# Patient Record
Sex: FEMALE | URBAN - METROPOLITAN AREA
[De-identification: names, ages, dates, MRNs, and addresses within clinical notes are randomized per-mention and may not be internally consistent; named-entity substitution may affect disease eponyms.]

---

## 2017-05-15 ENCOUNTER — APPOINTMENT (RX ONLY)
Dept: URBAN - METROPOLITAN AREA CLINIC 161 | Facility: CLINIC | Age: 67
Setting detail: DERMATOLOGY
End: 2017-05-15

## 2017-05-15 DIAGNOSIS — L81.4 OTHER MELANIN HYPERPIGMENTATION: ICD-10-CM

## 2017-05-15 DIAGNOSIS — L57.0 ACTINIC KERATOSIS: ICD-10-CM

## 2017-05-15 DIAGNOSIS — D22 MELANOCYTIC NEVI: ICD-10-CM

## 2017-05-15 DIAGNOSIS — L82.1 OTHER SEBORRHEIC KERATOSIS: ICD-10-CM

## 2017-05-15 PROBLEM — D22.62 MELANOCYTIC NEVI OF LEFT UPPER LIMB, INCLUDING SHOULDER: Status: ACTIVE | Noted: 2017-05-15

## 2017-05-15 PROBLEM — D22.61 MELANOCYTIC NEVI OF RIGHT UPPER LIMB, INCLUDING SHOULDER: Status: ACTIVE | Noted: 2017-05-15

## 2017-05-15 PROBLEM — D22.5 MELANOCYTIC NEVI OF TRUNK: Status: ACTIVE | Noted: 2017-05-15

## 2017-05-15 PROCEDURE — ? PRESCRIPTION

## 2017-05-15 PROCEDURE — 17000 DESTRUCT PREMALG LESION: CPT

## 2017-05-15 PROCEDURE — 99213 OFFICE O/P EST LOW 20 MIN: CPT | Mod: 25

## 2017-05-15 PROCEDURE — ? LIQUID NITROGEN

## 2017-05-15 PROCEDURE — 17003 DESTRUCT PREMALG LES 2-14: CPT

## 2017-05-15 PROCEDURE — ? COUNSELING

## 2017-05-15 RX ORDER — HYDROQUINONE 4 %
CREAM (GRAM) TOPICAL
Qty: 1 | Refills: 1 | Status: ERX | COMMUNITY
Start: 2017-05-15

## 2017-05-15 RX ADMIN — Medication APPLY: at 00:00

## 2017-05-15 ASSESSMENT — LOCATION DETAILED DESCRIPTION DERM
LOCATION DETAILED: LEFT MEDIAL SUPERIOR CHEST
LOCATION DETAILED: RIGHT PROXIMAL LATERAL POSTERIOR UPPER ARM
LOCATION DETAILED: NASAL DORSUM
LOCATION DETAILED: RIGHT MEDIAL BREAST 12-1:00 REGION
LOCATION DETAILED: LEFT INFERIOR CENTRAL MALAR CHEEK
LOCATION DETAILED: LEFT PROXIMAL POSTERIOR UPPER ARM
LOCATION DETAILED: RIGHT SUPERIOR FOREHEAD
LOCATION DETAILED: RIGHT CENTRAL ZYGOMA
LOCATION DETAILED: RIGHT INFERIOR UPPER BACK
LOCATION DETAILED: RIGHT MEDIAL UPPER BACK

## 2017-05-15 ASSESSMENT — LOCATION SIMPLE DESCRIPTION DERM
LOCATION SIMPLE: RIGHT FOREHEAD
LOCATION SIMPLE: LEFT POSTERIOR UPPER ARM
LOCATION SIMPLE: RIGHT BREAST
LOCATION SIMPLE: RIGHT ZYGOMA
LOCATION SIMPLE: RIGHT POSTERIOR UPPER ARM
LOCATION SIMPLE: RIGHT UPPER BACK
LOCATION SIMPLE: NOSE
LOCATION SIMPLE: CHEST
LOCATION SIMPLE: LEFT CHEEK

## 2017-05-15 ASSESSMENT — LOCATION ZONE DERM
LOCATION ZONE: TRUNK
LOCATION ZONE: FACE
LOCATION ZONE: NOSE
LOCATION ZONE: ARM

## 2023-05-17 NOTE — HPI: EVALUATION OF SKIN LESION(S)
How Severe Are Your Spot(S)?: mild
Have Your Spot(S) Been Treated In The Past?: has not been treated
Hpi Title: Evaluation of Skin Lesions
Bcc Infiltrative Histology Text: There were numerous aggregates of basaloid cells demonstrating an infiltrative pattern.

## 2023-10-23 ENCOUNTER — HOSPITAL ENCOUNTER (OUTPATIENT)
Facility: HOSPITAL | Age: 73
Discharge: HOME | End: 2023-10-24
Attending: STUDENT IN AN ORGANIZED HEALTH CARE EDUCATION/TRAINING PROGRAM | Admitting: NEUROLOGICAL SURGERY
Payer: MEDICARE

## 2023-10-23 DIAGNOSIS — S19.80XA: ICD-10-CM
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
## 2023-10-23 DIAGNOSIS — S06.5XAA SDH (SUBDURAL HEMATOMA): Primary | ICD-10-CM

## 2023-10-23 DIAGNOSIS — F10.920 ALCOHOLIC INTOXICATION WITHOUT COMPLICATION: ICD-10-CM

## 2023-10-23 DIAGNOSIS — I63.9 STROKE: ICD-10-CM

## 2023-10-23 DIAGNOSIS — R19.7 DIARRHEA, UNSPECIFIED TYPE: ICD-10-CM

## 2023-10-23 PROBLEM — R29.898 RIGHT HAND WEAKNESS: Status: ACTIVE | Noted: 2023-10-23

## 2023-10-23 LAB
ABO + RH BLD: NORMAL
ALBUMIN SERPL BCP-MCNC: 3.8 G/DL (ref 3.5–5.2)
ALP SERPL-CCNC: 100 U/L (ref 55–135)
ALT SERPL W/O P-5'-P-CCNC: 28 U/L (ref 10–44)
ANION GAP SERPL CALC-SCNC: 14 MMOL/L (ref 8–16)
AST SERPL-CCNC: 34 U/L (ref 10–40)
BASOPHILS # BLD AUTO: 0.09 K/UL (ref 0–0.2)
BASOPHILS NFR BLD: 1 % (ref 0–1.9)
BILIRUB SERPL-MCNC: 0.3 MG/DL (ref 0.1–1)
BLD GP AB SCN CELLS X3 SERPL QL: NORMAL
BUN SERPL-MCNC: 8 MG/DL (ref 8–23)
CALCIUM SERPL-MCNC: 8.8 MG/DL (ref 8.7–10.5)
CHLORIDE SERPL-SCNC: 108 MMOL/L (ref 95–110)
CHOLEST SERPL-MCNC: 177 MG/DL (ref 120–199)
CHOLEST/HDLC SERPL: 2.6 {RATIO} (ref 2–5)
CO2 SERPL-SCNC: 17 MMOL/L (ref 23–29)
CREAT SERPL-MCNC: 0.7 MG/DL (ref 0.5–1.4)
DIFFERENTIAL METHOD: ABNORMAL
EOSINOPHIL # BLD AUTO: 0.2 K/UL (ref 0–0.5)
EOSINOPHIL NFR BLD: 2.2 % (ref 0–8)
ERYTHROCYTE [DISTWIDTH] IN BLOOD BY AUTOMATED COUNT: 14.4 % (ref 11.5–14.5)
EST. GFR  (NO RACE VARIABLE): >60 ML/MIN/1.73 M^2
ETHANOL SERPL-MCNC: 124 MG/DL
GLUCOSE SERPL-MCNC: 88 MG/DL (ref 70–110)
HCT VFR BLD AUTO: 45.2 % (ref 37–48.5)
HCV AB SERPL QL IA: NORMAL
HDLC SERPL-MCNC: 69 MG/DL (ref 40–75)
HDLC SERPL: 39 % (ref 20–50)
HGB BLD-MCNC: 14.6 G/DL (ref 12–16)
HIV 1+2 AB+HIV1 P24 AG SERPL QL IA: NORMAL
IMM GRANULOCYTES # BLD AUTO: 0.04 K/UL (ref 0–0.04)
IMM GRANULOCYTES NFR BLD AUTO: 0.5 % (ref 0–0.5)
INR PPP: 1 (ref 0.8–1.2)
LDLC SERPL CALC-MCNC: 84.8 MG/DL (ref 63–159)
LYMPHOCYTES # BLD AUTO: 1.4 K/UL (ref 1–4.8)
LYMPHOCYTES NFR BLD: 16.2 % (ref 18–48)
MCH RBC QN AUTO: 29.9 PG (ref 27–31)
MCHC RBC AUTO-ENTMCNC: 32.3 G/DL (ref 32–36)
MCV RBC AUTO: 92 FL (ref 82–98)
MONOCYTES # BLD AUTO: 0.7 K/UL (ref 0.3–1)
MONOCYTES NFR BLD: 8.2 % (ref 4–15)
NEUTROPHILS # BLD AUTO: 6.3 K/UL (ref 1.8–7.7)
NEUTROPHILS NFR BLD: 71.9 % (ref 38–73)
NONHDLC SERPL-MCNC: 108 MG/DL
NRBC BLD-RTO: 0 /100 WBC
PLATELET # BLD AUTO: 233 K/UL (ref 150–450)
PMV BLD AUTO: 9.8 FL (ref 9.2–12.9)
POC PTINR: 1 (ref 0.9–1.2)
POC PTWBT: 11.7 SEC (ref 9.7–14.3)
POCT GLUCOSE: 139 MG/DL (ref 70–110)
POTASSIUM SERPL-SCNC: 3.4 MMOL/L (ref 3.5–5.1)
PROT SERPL-MCNC: 6.9 G/DL (ref 6–8.4)
PROTHROMBIN TIME: 10.4 SEC (ref 9–12.5)
RBC # BLD AUTO: 4.89 M/UL (ref 4–5.4)
SAMPLE: NORMAL
SODIUM SERPL-SCNC: 139 MMOL/L (ref 136–145)
SPECIMEN OUTDATE: NORMAL
TRIGL SERPL-MCNC: 116 MG/DL (ref 30–150)
TSH SERPL DL<=0.005 MIU/L-ACNC: 1.62 UIU/ML (ref 0.4–4)
WBC # BLD AUTO: 8.71 K/UL (ref 3.9–12.7)

## 2023-10-23 PROCEDURE — 93010 ELECTROCARDIOGRAM REPORT: CPT | Mod: ,,, | Performed by: INTERNAL MEDICINE

## 2023-10-23 PROCEDURE — 80053 COMPREHEN METABOLIC PANEL: CPT | Performed by: STUDENT IN AN ORGANIZED HEALTH CARE EDUCATION/TRAINING PROGRAM

## 2023-10-23 PROCEDURE — 25500020 PHARM REV CODE 255: Performed by: STUDENT IN AN ORGANIZED HEALTH CARE EDUCATION/TRAINING PROGRAM

## 2023-10-23 PROCEDURE — 99204 PR OFFICE/OUTPT VISIT, NEW, LEVL IV, 45-59 MIN: ICD-10-PCS | Mod: FS,,, | Performed by: PSYCHIATRY & NEUROLOGY

## 2023-10-23 PROCEDURE — 99204 OFFICE O/P NEW MOD 45 MIN: CPT | Mod: FS,,, | Performed by: PSYCHIATRY & NEUROLOGY

## 2023-10-23 PROCEDURE — 93010 EKG 12-LEAD: ICD-10-PCS | Mod: ,,, | Performed by: INTERNAL MEDICINE

## 2023-10-23 PROCEDURE — 99900035 HC TECH TIME PER 15 MIN (STAT)

## 2023-10-23 PROCEDURE — 85610 PROTHROMBIN TIME: CPT

## 2023-10-23 PROCEDURE — 86900 BLOOD TYPING SEROLOGIC ABO: CPT | Performed by: STUDENT IN AN ORGANIZED HEALTH CARE EDUCATION/TRAINING PROGRAM

## 2023-10-23 PROCEDURE — 85025 COMPLETE CBC W/AUTO DIFF WBC: CPT | Performed by: STUDENT IN AN ORGANIZED HEALTH CARE EDUCATION/TRAINING PROGRAM

## 2023-10-23 PROCEDURE — 93005 ELECTROCARDIOGRAM TRACING: CPT

## 2023-10-23 PROCEDURE — 90471 IMMUNIZATION ADMIN: CPT | Performed by: STUDENT IN AN ORGANIZED HEALTH CARE EDUCATION/TRAINING PROGRAM

## 2023-10-23 PROCEDURE — 87389 HIV-1 AG W/HIV-1&-2 AB AG IA: CPT | Performed by: PHYSICIAN ASSISTANT

## 2023-10-23 PROCEDURE — 63600175 PHARM REV CODE 636 W HCPCS: Performed by: STUDENT IN AN ORGANIZED HEALTH CARE EDUCATION/TRAINING PROGRAM

## 2023-10-23 PROCEDURE — 80061 LIPID PANEL: CPT | Performed by: STUDENT IN AN ORGANIZED HEALTH CARE EDUCATION/TRAINING PROGRAM

## 2023-10-23 PROCEDURE — 90715 TDAP VACCINE 7 YRS/> IM: CPT | Performed by: STUDENT IN AN ORGANIZED HEALTH CARE EDUCATION/TRAINING PROGRAM

## 2023-10-23 PROCEDURE — 99285 EMERGENCY DEPT VISIT HI MDM: CPT | Mod: 25

## 2023-10-23 PROCEDURE — 84443 ASSAY THYROID STIM HORMONE: CPT | Performed by: STUDENT IN AN ORGANIZED HEALTH CARE EDUCATION/TRAINING PROGRAM

## 2023-10-23 PROCEDURE — 82077 ASSAY SPEC XCP UR&BREATH IA: CPT | Performed by: STUDENT IN AN ORGANIZED HEALTH CARE EDUCATION/TRAINING PROGRAM

## 2023-10-23 PROCEDURE — 86803 HEPATITIS C AB TEST: CPT | Performed by: PHYSICIAN ASSISTANT

## 2023-10-23 PROCEDURE — 85610 PROTHROMBIN TIME: CPT | Performed by: STUDENT IN AN ORGANIZED HEALTH CARE EDUCATION/TRAINING PROGRAM

## 2023-10-23 RX ADMIN — IOHEXOL 100 ML: 350 INJECTION, SOLUTION INTRAVENOUS at 07:10

## 2023-10-23 RX ADMIN — TETANUS TOXOID, REDUCED DIPHTHERIA TOXOID AND ACELLULAR PERTUSSIS VACCINE, ADSORBED 0.5 ML: 5; 2.5; 8; 8; 2.5 SUSPENSION INTRAMUSCULAR at 09:10

## 2023-10-24 VITALS
SYSTOLIC BLOOD PRESSURE: 138 MMHG | HEART RATE: 86 BPM | RESPIRATION RATE: 16 BRPM | HEIGHT: 60 IN | TEMPERATURE: 98 F | DIASTOLIC BLOOD PRESSURE: 61 MMHG | BODY MASS INDEX: 26.27 KG/M2 | WEIGHT: 133.81 LBS | OXYGEN SATURATION: 93 %

## 2023-10-24 PROBLEM — S92.301A: Status: ACTIVE | Noted: 2023-10-24

## 2023-10-24 LAB
ANION GAP SERPL CALC-SCNC: 11 MMOL/L (ref 8–16)
APTT PPP: <21 SEC (ref 21–32)
BASOPHILS # BLD AUTO: 0.03 K/UL (ref 0–0.2)
BASOPHILS NFR BLD: 0.3 % (ref 0–1.9)
BUN SERPL-MCNC: 9 MG/DL (ref 8–23)
CALCIUM SERPL-MCNC: 9 MG/DL (ref 8.7–10.5)
CHLORIDE SERPL-SCNC: 106 MMOL/L (ref 95–110)
CO2 SERPL-SCNC: 22 MMOL/L (ref 23–29)
CREAT SERPL-MCNC: 0.7 MG/DL (ref 0.5–1.4)
DIFFERENTIAL METHOD: ABNORMAL
EOSINOPHIL # BLD AUTO: 0 K/UL (ref 0–0.5)
EOSINOPHIL NFR BLD: 0 % (ref 0–8)
ERYTHROCYTE [DISTWIDTH] IN BLOOD BY AUTOMATED COUNT: 14.8 % (ref 11.5–14.5)
EST. GFR  (NO RACE VARIABLE): >60 ML/MIN/1.73 M^2
GLUCOSE SERPL-MCNC: 125 MG/DL (ref 70–110)
HCT VFR BLD AUTO: 44.1 % (ref 37–48.5)
HGB BLD-MCNC: 14.6 G/DL (ref 12–16)
IMM GRANULOCYTES # BLD AUTO: 0.06 K/UL (ref 0–0.04)
IMM GRANULOCYTES NFR BLD AUTO: 0.5 % (ref 0–0.5)
LYMPHOCYTES # BLD AUTO: 0.6 K/UL (ref 1–4.8)
LYMPHOCYTES NFR BLD: 5.2 % (ref 18–48)
MCH RBC QN AUTO: 30.6 PG (ref 27–31)
MCHC RBC AUTO-ENTMCNC: 33.1 G/DL (ref 32–36)
MCV RBC AUTO: 93 FL (ref 82–98)
MONOCYTES # BLD AUTO: 0.4 K/UL (ref 0.3–1)
MONOCYTES NFR BLD: 3.8 % (ref 4–15)
NEUTROPHILS # BLD AUTO: 10.5 K/UL (ref 1.8–7.7)
NEUTROPHILS NFR BLD: 90.2 % (ref 38–73)
NRBC BLD-RTO: 0 /100 WBC
PLATELET # BLD AUTO: 240 K/UL (ref 150–450)
PMV BLD AUTO: 10.2 FL (ref 9.2–12.9)
POTASSIUM SERPL-SCNC: 4 MMOL/L (ref 3.5–5.1)
RBC # BLD AUTO: 4.77 M/UL (ref 4–5.4)
SODIUM SERPL-SCNC: 139 MMOL/L (ref 136–145)
WBC # BLD AUTO: 11.65 K/UL (ref 3.9–12.7)

## 2023-10-24 PROCEDURE — 99223 1ST HOSP IP/OBS HIGH 75: CPT | Mod: ,,, | Performed by: NEUROLOGICAL SURGERY

## 2023-10-24 PROCEDURE — 85025 COMPLETE CBC W/AUTO DIFF WBC: CPT

## 2023-10-24 PROCEDURE — 96374 THER/PROPH/DIAG INJ IV PUSH: CPT

## 2023-10-24 PROCEDURE — 80048 BASIC METABOLIC PNL TOTAL CA: CPT | Performed by: NEUROLOGICAL SURGERY

## 2023-10-24 PROCEDURE — 63600175 PHARM REV CODE 636 W HCPCS: Performed by: EMERGENCY MEDICINE

## 2023-10-24 PROCEDURE — 85730 THROMBOPLASTIN TIME PARTIAL: CPT | Performed by: EMERGENCY MEDICINE

## 2023-10-24 PROCEDURE — G0378 HOSPITAL OBSERVATION PER HR: HCPCS

## 2023-10-24 PROCEDURE — 99232 SBSQ HOSP IP/OBS MODERATE 35: CPT | Mod: ,,, | Performed by: PHYSICIAN ASSISTANT

## 2023-10-24 PROCEDURE — 99232 PR SUBSEQUENT HOSPITAL CARE,LEVL II: ICD-10-PCS | Mod: ,,, | Performed by: PHYSICIAN ASSISTANT

## 2023-10-24 PROCEDURE — 96375 TX/PRO/DX INJ NEW DRUG ADDON: CPT

## 2023-10-24 PROCEDURE — 63600175 PHARM REV CODE 636 W HCPCS

## 2023-10-24 PROCEDURE — 82962 GLUCOSE BLOOD TEST: CPT

## 2023-10-24 PROCEDURE — 36415 COLL VENOUS BLD VENIPUNCTURE: CPT | Performed by: NEUROLOGICAL SURGERY

## 2023-10-24 PROCEDURE — 99223 PR INITIAL HOSPITAL CARE,LEVL III: ICD-10-PCS | Mod: ,,, | Performed by: NEUROLOGICAL SURGERY

## 2023-10-24 PROCEDURE — 25000003 PHARM REV CODE 250

## 2023-10-24 RX ORDER — LEVOTHYROXINE SODIUM 50 UG/1
50 TABLET ORAL
COMMUNITY

## 2023-10-24 RX ORDER — AMOXICILLIN 250 MG
2 CAPSULE ORAL NIGHTLY PRN
Status: DISCONTINUED | OUTPATIENT
Start: 2023-10-24 | End: 2023-10-24 | Stop reason: HOSPADM

## 2023-10-24 RX ORDER — IBUPROFEN 200 MG
16 TABLET ORAL
Status: DISCONTINUED | OUTPATIENT
Start: 2023-10-24 | End: 2023-10-24 | Stop reason: HOSPADM

## 2023-10-24 RX ORDER — HYDROCODONE BITARTRATE AND ACETAMINOPHEN 5; 325 MG/1; MG/1
1 TABLET ORAL EVERY 4 HOURS PRN
Status: DISCONTINUED | OUTPATIENT
Start: 2023-10-24 | End: 2023-10-24 | Stop reason: HOSPADM

## 2023-10-24 RX ORDER — ONDANSETRON 2 MG/ML
4 INJECTION INTRAMUSCULAR; INTRAVENOUS EVERY 6 HOURS PRN
Status: DISCONTINUED | OUTPATIENT
Start: 2023-10-24 | End: 2023-10-24 | Stop reason: HOSPADM

## 2023-10-24 RX ORDER — FLUOXETINE HYDROCHLORIDE 20 MG/1
20 CAPSULE ORAL DAILY
COMMUNITY

## 2023-10-24 RX ORDER — IBUPROFEN 200 MG
24 TABLET ORAL
Status: DISCONTINUED | OUTPATIENT
Start: 2023-10-24 | End: 2023-10-24 | Stop reason: HOSPADM

## 2023-10-24 RX ORDER — GLUCAGON 1 MG
1 KIT INJECTION
Status: DISCONTINUED | OUTPATIENT
Start: 2023-10-24 | End: 2023-10-24 | Stop reason: HOSPADM

## 2023-10-24 RX ORDER — ACETAMINOPHEN 325 MG/1
650 TABLET ORAL EVERY 4 HOURS PRN
Status: DISCONTINUED | OUTPATIENT
Start: 2023-10-24 | End: 2023-10-24 | Stop reason: HOSPADM

## 2023-10-24 RX ORDER — METHOTREXATE 2.5 MG/1
2.5 TABLET ORAL
COMMUNITY

## 2023-10-24 RX ORDER — DROPERIDOL 2.5 MG/ML
2.5 INJECTION, SOLUTION INTRAMUSCULAR; INTRAVENOUS ONCE AS NEEDED
Status: COMPLETED | OUTPATIENT
Start: 2023-10-24 | End: 2023-10-24

## 2023-10-24 RX ADMIN — DROPERIDOL 2.5 MG: 2.5 INJECTION, SOLUTION INTRAMUSCULAR; INTRAVENOUS at 12:10

## 2023-10-24 RX ADMIN — ACETAMINOPHEN 650 MG: 325 TABLET ORAL at 02:10

## 2023-10-24 RX ADMIN — ONDANSETRON 4 MG: 2 INJECTION INTRAMUSCULAR; INTRAVENOUS at 09:10

## 2023-10-24 RX ADMIN — ACETAMINOPHEN 650 MG: 325 TABLET ORAL at 09:10

## 2023-10-24 NOTE — DISCHARGE SUMMARY
[facility] - Neurosurgery ([facility])  Neurosurgery  Discharge Summary      Patient Name: Loly Pereira  MRN: [id number]  Admission Date: 10/23/2023  Hospital Length of Stay: 0 days  Discharge Date and Time:  10/24/2023 3:39 PM  Attending Physician: Tuan Diaz MD   Discharging Provider: Kalani Marks PA-C  Primary Care Provider: Tuan Diaz MD    HPI:   Pt is a 73 F with pmh of lung cancer s/p resection in 2015 presents with acute parafalcine SDH s/p fall. Pt was intoxicated fell on to tile face first with LOC for approximately 30 min with no recollection of event. She currently only has headache and jaw pain with no nausea, vision changes, weakness or numbness. Pt is not on any antiplt or anticoagulant medications.      * No surgery found *     Hospital Course: NAEON. AAOx4. C/o facial pain where bruising is located and right hand pain. Neuro exam stable. Patient does not drink daily. She states she had 2 margaritas yesterday prior to falling. LOC for 3 minutes. Denies seizure activity. No surgical intervention for SDH, f/u in 2 weeks. Found with right hand fracture, splint applied by orthopedics, f/u per ortho in [place]. Cdiff testing ordered by ED after 1 loose stool in ED. Patient denies further BMs. Afebrile without leukocytosis, no recent antibiotic use, no abdominal tenderness or diarrhea. Dc Cdiff precautions and testing. Plan for discharge home today with close follow-up with neurosurgery, PCP, and orthopedics in [place]. Records faxed to Dr. Marcos with [facility]. Imaging copied to a disc for patient to bring to appointment. Son and friend present at bedside, all questions answered.     General: well developed, well nourished, no distress.   Head: normocephalic, right jaw edema and bruising, right periorbital ecchymosis  Neck: No midline TTP. Full ROM.   Neurologic: Alert and oriented. Thought content appropriate.  GCS: Motor: 6/Verbal: 5/Eyes: 4 GCS Total: 15  Mental Status: Awake,  Alert, Oriented x 4  Language: No aphasia  Speech: No dysarthria  Cranial nerves: face symmetric, tongue midline, CN II-XII grossly intact.   Eyes: pupils equal, round, reactive to light with accomodation, EOMI.  Pulmonary: normal respirations, no signs of respiratory distress  Abdomen: soft, non-distended, not tender to palpation  Sensory: intact to light touch throughout  Motor Strength: Moves all extremities spontaneously with good tone.  Full strength upper and lower extremities. No abnormal movements seen.     Strength  Deltoids Triceps Biceps Wrist Extension Wrist Flexion Hand    Upper: R 5/5 5/5 5/5 5/5 5/5 5/5    L 5/5 5/5 5/5 5/5 5/5 5/5     Iliopsoas Quadriceps Knee  Flexion Tibialis  anterior Gastro- cnemius EHL   Lower: R 5/5 5/5 5/5 5/5 5/5 5/5    L 5/5 5/5 5/5 5/5 5/5 5/5     Pronator Drift: no drift noted  Finger-to-nose: Intact bilaterally  Salazar: absent  Skin: Skin is warm, dry and intact.  Bruising and edema to dorsal aspect of right hand, mild TTP.      Goals of Care Treatment Preferences:  Code Status: Full Code      Consults:   Consults (From admission, onward)        Status Ordering Provider     Inpatient consult to Orthopedics  Once        Provider:  (Not yet assigned)    Acknowledged WEI HUI     Inpatient consult to Vascular (Stroke) Neurology  Once        Provider:  (Not yet assigned)    Completed BELEM MAGALLANES          Significant Diagnostic Studies: Labs:   BMP:   Recent Labs   Lab 10/23/23  1953 10/24/23  0845   GLU 88 125*    139   K 3.4* 4.0    106   CO2 17* 22*   BUN 8 9   CREATININE 0.7 0.7   CALCIUM 8.8 9.0   , CBC   Recent Labs   Lab 10/23/23  1953 10/24/23  0325   WBC 8.71 11.65   HGB 14.6 14.6   HCT 45.2 44.1    240    and All labs within the past 24 hours have been reviewed  Radiology:   Imaging Results          X-Ray Cervical Spine 5 View With Flex And Ext (Final result)  Result time 10/24/23 02:09:50    Final result by Jareth Salazar MD  (10/24/23 02:09:50)                 Impression:      No acute cervical fracture.      Electronically signed by: Jareth Salazar MD  Date:    10/24/2023  Time:    02:09             Narrative:    EXAMINATION:  XR CERVICAL SPINE 5 VIEW WITH FLEX AND EXT    CLINICAL HISTORY:  Other specified injuries of unspecified part of neck, initial encounter    TECHNIQUE:  Five views of the cervical spine plus flexion and extension views were performed.    COMPARISON:  CTA stroke multiphase 10/23/2023.    FINDINGS:  C1-C2: Pre-dens space is maintained.  No instability identified on flexion extension views Dens and lateral masses of C1 are unremarkable.    Alignment: Alignment is maintained.  No segmental instability on flexion extension views.  Lordosis is maintained.    Vertebrae: Vertebral body heights are maintained.    Discs and facets: Disc heights are maintained.  Facet joints are unremarkable.    Miscellaneous: No prevertebral soft tissue thickening.                               CT Head Without Contrast (Final result)  Result time 10/24/23 03:01:10    Final result by Jareth Salazar MD (10/24/23 03:01:10)                 Impression:      Stable subdural hemorrhage along the falx extending into the right tentorium.  No new hemorrhage.    Previously questioned hyperdensity about the lower parasagittal left frontal lobe is not visualized, likely artifact on prior exam.  There is, however, extension of the subdural hemorrhage along the falx down to the skull base in this region as well.    Redemonstration of soft tissue swelling involving the right supraorbital region, and soft tissues anterior to the mandible and maxilla.  Interval enlargement of small hematoma anterior to the mandible.    No acute displaced fracture of the facial bones.    Electronically signed by resident: Carson Reid  Date:    10/24/2023  Time:    01:17    Electronically signed by: Jareth Salazar MD  Date:    10/24/2023  Time:    03:01              Narrative:    EXAMINATION:  CT HEAD WITHOUT CONTRAST; CT MAXILLOFACIAL WITHOUT CONTRAST    CLINICAL HISTORY:  Subdural hemorrhage;Repeat scan for subdural;; Facial trauma, blunt;    TECHNIQUE:  Low dose axial CT images obtained throughout the head without the use of intravenous contrast.  Axial, sagittal and coronal reconstructions were performed.    Low dose axial images, sagittal and coronal reformations were obtained through the face.  Contrast was not administered.    COMPARISON:  CTA head neck from same date    FINDINGS:  Stable thin subdural hemorrhage along the falx extending into the right tentorium.  Previously questioned hyperdensity about the lower parasagittal left frontal lobe is not visualized, likely artifact on prior exam.  No new hemorrhage.    Diffuse supratentorial white matter hypoattenuation, nonspecific, likely represents chronic small vessel ischemic change.  Focus of hypoattenuation in the white matter adjacent to the right insular cortex, likely remote lacunar-type infarct.  No mass effect. No evidence of acute infarction.    Ventricles are unchanged in size and configuration.  No overt hydrocephalus.    No calvarial fracture.    Redemonstration of soft tissue swelling involving the right supraorbital region, soft tissues anterior to the mandible extending towards the right, and soft tissues anterior to the maxilla inferior to the nose.  Again, there is several locules of subcutaneous air about the soft tissues anterior to the axilla.  There is interval enlargement of a hematoma anterior to the mandible measuring 2.3 cm (axial series 3, image 92; previously 1.5 cm).    Bony orbits, nasal bones, zygomatic arches, pterygoid plates, maxilla and mandible are intact.  No facial bone fracture.  Temporomandibular joints are aligned.    Optic globes, optic nerves, and extraocular muscles are without significant abnormality.  No infiltration of retrobulbar fat.    Paranasal sinuses and mastoid air  cells are normally aerated.  Mild mucosal thickening in the left maxillary antrum.    Salivary glands are without significant abnormality.    Pharynx and prevertebral soft tissues are without significant abnormality.    Degenerative changes of the cervical spine.                               CT Maxillofacial Without Contrast (Final result)  Result time 10/24/23 03:01:10    Final result by Jareth Salazar MD (10/24/23 03:01:10)                 Impression:      Stable subdural hemorrhage along the falx extending into the right tentorium.  No new hemorrhage.    Previously questioned hyperdensity about the lower parasagittal left frontal lobe is not visualized, likely artifact on prior exam.  There is, however, extension of the subdural hemorrhage along the falx down to the skull base in this region as well.    Redemonstration of soft tissue swelling involving the right supraorbital region, and soft tissues anterior to the mandible and maxilla.  Interval enlargement of small hematoma anterior to the mandible.    No acute displaced fracture of the facial bones.    Electronically signed by resident: Carson Reid  Date:    10/24/2023  Time:    01:17    Electronically signed by: Jareth Salazar MD  Date:    10/24/2023  Time:    03:01             Narrative:    EXAMINATION:  CT HEAD WITHOUT CONTRAST; CT MAXILLOFACIAL WITHOUT CONTRAST    CLINICAL HISTORY:  Subdural hemorrhage;Repeat scan for subdural;; Facial trauma, blunt;    TECHNIQUE:  Low dose axial CT images obtained throughout the head without the use of intravenous contrast.  Axial, sagittal and coronal reconstructions were performed.    Low dose axial images, sagittal and coronal reformations were obtained through the face.  Contrast was not administered.    COMPARISON:  CTA head neck from same date    FINDINGS:  Stable thin subdural hemorrhage along the falx extending into the right tentorium.  Previously questioned hyperdensity about the lower parasagittal left  frontal lobe is not visualized, likely artifact on prior exam.  No new hemorrhage.    Diffuse supratentorial white matter hypoattenuation, nonspecific, likely represents chronic small vessel ischemic change.  Focus of hypoattenuation in the white matter adjacent to the right insular cortex, likely remote lacunar-type infarct.  No mass effect. No evidence of acute infarction.    Ventricles are unchanged in size and configuration.  No overt hydrocephalus.    No calvarial fracture.    Redemonstration of soft tissue swelling involving the right supraorbital region, soft tissues anterior to the mandible extending towards the right, and soft tissues anterior to the maxilla inferior to the nose.  Again, there is several locules of subcutaneous air about the soft tissues anterior to the axilla.  There is interval enlargement of a hematoma anterior to the mandible measuring 2.3 cm (axial series 3, image 92; previously 1.5 cm).    Bony orbits, nasal bones, zygomatic arches, pterygoid plates, maxilla and mandible are intact.  No facial bone fracture.  Temporomandibular joints are aligned.    Optic globes, optic nerves, and extraocular muscles are without significant abnormality.  No infiltration of retrobulbar fat.    Paranasal sinuses and mastoid air cells are normally aerated.  Mild mucosal thickening in the left maxillary antrum.    Salivary glands are without significant abnormality.    Pharynx and prevertebral soft tissues are without significant abnormality.    Degenerative changes of the cervical spine.                                CTA STROKE MULTI-PHASE (Final result)  Result time 10/23/23 21:30:08    Final result by Mir Candelario MD (10/23/23 21:30:08)                 Impression:      Thin subdural hemorrhage tracking along the falx extending to the right tentorium.    Hyperdensity about the lower parasagittal left frontal lobe in a region of streak artifact, likely artifactual however cannot exclude cortical  contusions in the setting of recent trauma.  Attention on follow-up.    Right supraorbital scalp soft tissue swelling/contusion without displaced skull fracture.  Skin thickening and soft tissue swelling anterior to the mandible and maxilla.  Additionally, several foci of subcutaneous air in the subcutaneous tissues anterior to the maxilla.  Compatible with reported history of trauma.  No foreign body identified.    No evidence of acute large vessel occlusion or flow-limiting stenosis.    Additional findings as above.    This report was flagged in Epic as abnormal.    Findings were relayed by Carson Reid MD to Zoe SINGH via telephone on 10/23/2023 at 0819PM.    Electronically signed by resident: Carson Reid  Date:    10/23/2023  Time:    20:13    Electronically signed by: Mir Candelario MD  Date:    10/23/2023  Time:    21:30             Narrative:    EXAMINATION:  CTA STROKE MULTI-PHASE    CLINICAL HISTORY:  Neuro deficit, acute, stroke suspected;    TECHNIQUE:  Axial CT images obtained throughout the region of the head before and after the administration of intravenous contrast.  CT angiogram was performed from through the cervical and intracranial vasculature during the IV bolus administration of 100mL of Omnipaque 350.  Multiplanar MPR and MIP reformats were performed.    The examination was performed using a multiphase protocol with 2 additional delayed images through the brain.    CT source data was analyzed using artificial intelligence software for detection of a large vessel occlusions (LVO) in order to enable computer assisted triage notification and aid clinical stroke decision making.    COMPARISON:  None.    FINDINGS:  CT head & neck:    Hyperdense material tracking along the falx extending to the right tentorium compatible with hemorrhage.  There is hyperdensity about the lower parasagittal left frontal lobe in a region of streak artifact.    Diffuse supratentorial white matter hypoattenuation,  nonspecific, likely represents chronic small vessel ischemic change.  Focus of hypoattenuation in the white matter adjacent to the right insular cortex, likely remote lacunar-type infarct.  No evidence of acute infarction.  Scattered atherosclerotic vascular calcifications noted.    Age-related generalized cerebral volume loss.  Ventricles are midline without distortion by mass effect or acute hydrocephalus.  Basal cisterns are patent.    Minimal mucosal thickening within the left maxillary sinus.  Mastoid air cells and remaining imaged paranasal sinuses are well aerated.  Hyperostosis frontalis interna.  No displaced skull fracture.    There is an area of soft tissue swelling about the right supraorbital region, compatible with contusion.  Additionally, there is skin thickening and soft tissue swelling adjacent to the anterior mandible with a hyperdense focus measuring 1.5 cm compatible with small hematoma and surrounding soft tissue swelling/contusion.  There is involvement anterior to the maxilla, with several locules of subcutaneous air in this region.  No underlying fracture.  No foreign body.    Salivary glands are without significant abnormality. Thyroid is without significant abnormality.    No cervical lymphadenopathy.    Prevertebral soft tissues and pharynx are unremarkable.    Trachea is patent.  Visualized lungs are without significant abnormality.  Biapical pleuroparenchymal scarring.  No apical pneumothorax.  Postsurgical change about the left upper lung, presumably from prior lobectomy.    Degenerative changes throughout the cervical spine.  Generalized osteopenia.  Chronic appearing minimal to anterior wedge with superimposed mild compression deformity of the superior endplate of C7 vertebral body.  Suspected small intra osseous hemangioma at C7 vertebral body.  Prominent sclerotic focus within T2 vertebral body suggestive of a large bone island.  No acute displaced fracture-dislocation..    CT  angiogram:    Left-sided aortic arch with 3 branch vessels. Mild calcifications of the aortic arch.  Brachiocephalic and proximal subclavian arteries are patent.    Right common carotid artery is patent without significant stenosis. Right carotid bifurcation contains minimal atherosclerosis representing less than 50% stenosis per NASCET criteria. Right internal carotid artery is patent without significant stenosis.    Left common carotid artery is patent without significant stenosis. Left carotid bifurcation contains minimal atherosclerosis representing less than 50% stenosis per NASCET criteria. Left internal carotid artery is patent without significant stenosis.    Anterior cerebral arteries are patent without significant stenosis or aneurysm.  Middle cerebral arteries are patent without significant stenosis or aneurysm.    Right vertebral artery origin is patent. Right vertebral artery is patent without significant stenosis.    Left vertebral artery origin is patent. Left vertebral artery is patent without significant stenosis.  Left vertebral artery is slightly dominant.    Basilar artery is patent without significant stenosis.    Posterior cerebral arteries are patent without significant stenosis or aneurysm.    Dural venous sinuses and veins are patent.                                  Pending Diagnostic Studies:     None        Final Active Diagnoses:    Diagnosis Date Noted POA    PRINCIPAL PROBLEM:  Subdural hematoma [S06.5XAA] 10/23/2023 Unknown    Right hand weakness [R29.898] 10/23/2023 Yes      Problems Resolved During this Admission:      Discharged Condition: good     Disposition: Home or Self Care    Follow Up: 2 weeks with Neurosurgery in [place], follow-up with orthopedics in [place] for 4th metacarpal fracture    Patient Instructions:      Notify your health care provider if you experience any of the following:  temperature >100.4     Notify your health care provider if you experience any of the  following:  persistent nausea and vomiting or diarrhea     Notify your health care provider if you experience any of the following:  severe uncontrolled pain     Notify your health care provider if you experience any of the following:  redness, tenderness, or signs of infection (pain, swelling, redness, odor or green/yellow discharge around incision site)     Notify your health care provider if you experience any of the following:  difficulty breathing or increased cough     Notify your health care provider if you experience any of the following:  severe persistent headache     Notify your health care provider if you experience any of the following:  worsening rash     Notify your health care provider if you experience any of the following:  persistent dizziness, light-headedness, or visual disturbances     Notify your health care provider if you experience any of the following:  increased confusion or weakness     Activity as tolerated     Medications:  Reconciled Home Medications:      Medication List      CONTINUE taking these medications    FLUoxetine 20 MG capsule  Take 20 mg by mouth once daily.     levothyroxine 50 MCG tablet  Commonly known as: SYNTHROID  Take 50 mcg by mouth before breakfast.     methotrexate 2.5 MG Tab  Take 2.5 mg by mouth every 7 days.            Kalani Marks PA-C  Neurosurgery  [facility] - Neurosurgery ([facility])

## 2023-10-24 NOTE — NURSING
Patient is discharged to home.Iv access has been removed.Discharge instructions given at bedside and patient verbalized understanding.

## 2023-10-24 NOTE — SUBJECTIVE & OBJECTIVE
No past medical history on file.  No past surgical history on file.       Review of patient's allergies indicates:  No Known Allergies    Medications: I have reviewed the current medication administration record.    (Not in a hospital admission)      Review of Systems   HENT:  Positive for nosebleeds.         Bright red blood noticed to right head and face from trauma.    Respiratory:  Negative for shortness of breath.    Neurological:  Negative for speech difficulty, weakness, numbness and headaches.   Psychiatric/Behavioral:  Negative for agitation.      Objective:     Vital Signs (Most Recent):  Temp: 98.3 °F (36.8 °C) (10/23/23 1906)  Pulse: 80 (10/23/23 1906)  Resp: (!) 24 (10/23/23 1906)  BP: (!) 155/66 (10/23/23 1906)  SpO2: 99 % (10/23/23 1906)    Vital Signs Range (Last 24H):  Temp:  [98.3 °F (36.8 °C)]   Pulse:  [80]   Resp:  [24]   BP: (155)/(66)   SpO2:  [99 %]        Physical Exam  Constitutional:       General: She is not in acute distress.  HENT:      Head:      Comments: Bright red blood noticed to right head and face from trauma.      Nose:      Comments: Right nare with dried blood  Eyes:      Pupils: Pupils are equal, round, and reactive to light.   Pulmonary:      Effort: No respiratory distress.   Neurological:      Mental Status: She is alert. She is disoriented.      Cranial Nerves: No cranial nerve deficit.      Sensory: No sensory deficit.      Motor: No weakness.              Neurological Exam:   LOC: alert  Attention Span: periods of confusion  Language: No aphasia  Orientation: Person, Place, Not oriented to time  Visual Fields: Full  EOM (CN III, IV, VI): Full/intact  Pupils (CN II, III): PERRL  Facial Sensation (CN V): Normal  Facial Movement (CN VII): Symmetric facial expression    Motor: Arm left  Normal 5/5  Leg left  Normal 5/5  Arm right  Normal 5/5, R hand/wrist 4/5  Leg right Normal 5/5  Sensation: Intact to light touch, temperature and vibration      Laboratory:  CMP:  "  Recent Labs   Lab 10/23/23  1953   CALCIUM 8.8   ALBUMIN 3.8   PROT 6.9      K 3.4*   CO2 17*      BUN 8   CREATININE 0.7   ALKPHOS 100   ALT 28   AST 34   BILITOT 0.3     CBC:   Recent Labs   Lab 10/23/23  1953   WBC 8.71   RBC 4.89   HGB 14.6   HCT 45.2      MCV 92   MCH 29.9   MCHC 32.3     Lipid Panel:   Recent Labs   Lab 10/23/23  1953   CHOL 177   LDLCALC 84.8   HDL 69   TRIG 116     Coagulation:   Recent Labs   Lab 10/23/23  2037   INR 1.0     Hgb A1C: No results for input(s): "HGBA1C" in the last 168 hours.  TSH:   Recent Labs   Lab 10/23/23  1953   TSH 1.619       Diagnostic Results:      Brain imaging/Vessel Imaging:    CTA multiphase 10/23/23  Impression:     Thin subdural hemorrhage tracking along the falx extending to the right tentorium.     Hyperdensity about the lower parasagittal left frontal lobe in a region of streak artifact, likely artifactual however cannot exclude cortical contusions in the setting of recent trauma.  Attention on follow-up.     Right supraorbital scalp soft tissue swelling/contusion without displaced skull fracture.  Skin thickening and soft tissue swelling anterior to the mandible and maxilla.  Additionally, several foci of subcutaneous air in the subcutaneous tissues anterior to the maxilla.  Compatible with reported history of trauma.  No foreign body identified.     No evidence of acute large vessel occlusion or flow-limiting stenosis.    "

## 2023-10-24 NOTE — ASSESSMENT & PLAN NOTE
Pt is a 73 F with pmh of lung cancer s/p resection in 2015 presents with acute parafalcine SDH s/p fall.      CTA head and neck 10/23: Thin subdural hemorrhage tracking along the falx extending to the right tentorium. Chronic appearing C7 endplate fx, intraosseous hemanigioma. Sclerotic focus in T2        No acute neurosurgical intervention at this time   Admit to neurosurgery floor obs status on tele  q4h neurochecks  Repeat CTH in AM  Flex ex    Dispo likely home in AM  Discussed with Dr. Diaz

## 2023-10-24 NOTE — DISCHARGE INSTRUCTIONS
Neurosurgery Patient Information    -Ok to drive. Ok to travel by airline or car.  -Do not take any Aspirin or Aspirin containing products until your follow-up appointment in 2 weeks.  -Do not take any Aleeve, Naprosyn, Naproxen, Ibuprofen, Advil or any other NSAID until your follow-up appointment in 2 weeks.  -Do not take any herbal supplements until your follow-up appointment in 2 weeks.   -Do not consume any alcoholic beverages until released by your Neurosurgeon.  -Do not perform any excessive bending over or leaning forward as this is a fall hazard.  -Do not perform any heavy lifting or lifting more than 10 lbs from the ground level as this is a fall hazard.    Contact the Neurosurgery Office immediately if:  -If you begin to notice any neurologic changes such as:           -Sudden onset of lethargy or sleepiness           -Sudden confusion, trouble speaking, or understanding            -Sudden trouble seeing in one or both eyes            -Sudden trouble walking, dizziness, loss of coordination            -Sudden severe headache with no known cause            -Sudden onset of numbness or weakness       Miscellaneous:  -Follow up with a neurosurgeon in 2 weeks with a CT head without contrast.       Neurosurgery Office: [phone]

## 2023-10-24 NOTE — HPI
73 y.o. female with no significant medical history presented to the ED s/p mechanical fall. Per EMS, the patient is visiting a friend from [place] and had two margaritas this evening. She tripped and fell, hit her head and loss consciousness for about 25-30 minutes. Patient was LKN 30 minutes prior to arriving at the ED (around 1840). She became confused post fall with a right-sided weakness. Per EMS, her left pupil was pinpoint and fixed when they arrived on scene but became sluggish and reactive during transport. BS 89. On arrival to [facility] [place], the patient is awake and alert, follows commands. A&OX2 (disoriented to time), PERRL, slight right arm drift, slight dysarthria. NIHSS 3. CTA multiphase concerning for blood products.     At this time, symptoms seem more to be 2/2 trauma from the fall. Will obtain MRI to definitively r/o stroke.

## 2023-10-24 NOTE — H&P
[facility] - Emergency Dept  Neuorsurgery  History and Physical     Patient Name: Loly Pereira  MRN: [id number]  Admission Date: 10/23/2023  Attending Physician: Tuan Diaz  Primary Care Physician: No primary care provider on file.    Patient information was obtained from patient and ER records.     Subjective:     Chief Complaint/Reason for Admission: SDH     HPI:  Pt is a 73 F with pmh of lung cancer s/p resection in 2015 presents with acute parafalcine SDH s/p fall. Pt was intoxicated fell on to tile face first with LOC for approximately 30 min with no recollection of event. She currently only has headache and jaw pain with no nausea, vision changes, weakness or numbness. Pt is not on any antiplt or anticoagulant medications.      (Not in a hospital admission)      Review of patient's allergies indicates:  No Known Allergies    History reviewed. No pertinent past medical history.  No past surgical history on file.  Family History    None       Tobacco Use    Smoking status: Not on file    Smokeless tobacco: Not on file   Substance and Sexual Activity    Alcohol use: Not on file    Drug use: Not on file    Sexual activity: Not on file     Review of Systems   Constitutional:  Negative for activity change.   HENT:  Negative for congestion.    Respiratory:  Negative for chest tightness.    Cardiovascular:  Negative for chest pain.   Gastrointestinal:  Negative for abdominal distention.   Genitourinary:  Negative for difficulty urinating.   Musculoskeletal:  Negative for arthralgias and back pain.   Neurological:  Positive for headaches. Negative for dizziness.   Psychiatric/Behavioral:  Negative for agitation.      Objective:        There is no height or weight on file to calculate BMI.  Vital Signs (Most Recent):  Temp: 98.3 °F (36.8 °C) (10/23/23 1906)  Pulse: 95 (10/23/23 2130)  Resp: 20 (10/23/23 2130)  BP: (!) 151/66 (10/23/23 2130)  SpO2: 98 % (10/23/23 2130) Vital Signs (24h Range):  Temp:  [98.3 °F (36.8  °C)] 98.3 °F (36.8 °C)  Pulse:  [80-95] 95  Resp:  [20-24] 20  SpO2:  [97 %-99 %] 98 %  BP: (148-155)/(66-94) 151/66                                 Physical Exam         Neurosurgery Physical Exam  General: AOx3, GCS [id number]  CNII-XII: Intact on detailed exam, PERRL, visual fields grossly intact, EOMi, facial sensation preserved, no facial assymetry, tongue/uvula/palate midline, shoulder shrug equal, no pronator drift  Extremities: 5/5 motor throughout, sensorium intact throughout, coordination intact throughout, DTRs 2+, no pathological reflexes, no sensory level present    General: Awake, Alert, Oriented  Head: Non-traumatic, normocephalic  Eyes: Pupils equal, EOMi  Neck: Supple, normal ROM, no tenderness to palpation  CVS: Normal rate and rhythm, distal pulses present  Pulm: Symmetric expansion, no respiratory distress  GI: Abdomen nondistended, nontender  MSK: Moves all extremities without restriction, atraumatic  Skin: Dry, intact  Psych: Normal thought content and cognition    Significant Labs:  Recent Labs   Lab 10/23/23  1953   GLU 88      K 3.4*      CO2 17*   BUN 8   CREATININE 0.7   CALCIUM 8.8     Recent Labs   Lab 10/23/23  1953   WBC 8.71   HGB 14.6   HCT 45.2        Recent Labs   Lab 10/23/23  1953 10/23/23  2037   INR 1.0 1.0     Microbiology Results (last 7 days)       ** No results found for the last 168 hours. **          All pertinent labs from the last 24 hours have been reviewed.    Significant Diagnostics:  I have reviewed all pertinent imaging results/findings within the past 24 hours.    Assessment and Plan:     Subdural hematoma  Pt is a 73 F with pmh of lung cancer s/p resection in 2015 presents with acute parafalcine SDH s/p fall.      CTA head and neck 10/23: Thin subdural hemorrhage tracking along the falx extending to the right tentorium. Chronic appearing C7 endplate fx, intraosseous hemanigioma. Sclerotic focus in T2        No acute neurosurgical intervention at  this time   Admit to neurosurgery floor obs status on tele  q4h neurochecks  Repeat CTH in AM  Flex ex    Dispo likely home in AM  Discussed with Dr. Joe Styles MD  Neurosurgery  [facility] - Emergency Dept

## 2023-10-24 NOTE — PLAN OF CARE
Pt seen and examined. GCS 15 full strength, C collar cleared. Recommend Flex ex, and repeat scan in 6 hours. Plan to admit to neurosurgery obs status under Dr. Diaz however work up of jaw trauma pending. May require transfer for OMFS eval.

## 2023-10-24 NOTE — HPI
Pt is a 73 F with pmh of lung cancer s/p resection in 2015 presents with acute parafalcine SDH s/p fall. Pt was intoxicated fell on to tile face first with LOC for approximately 30 min with no recollection of event. She currently only has headache and jaw pain with no nausea, vision changes, weakness or numbness. Pt is not on any antiplt or anticoagulant medications.

## 2023-10-24 NOTE — ED NOTES
Telemetry Verification   Patient placed on Telemetry Box  Verified with War Room  Tech    Box # [id number]   Rate 103   Rhythm Sinus Tach

## 2023-10-24 NOTE — NURSING
Nurses Note -- 4 Eyes      10/24/2023   3:30 AM      Skin assessed during: Admit      [x] No Altered Skin Integrity Present    []Prevention Measures Documented      [] Yes- Altered Skin Integrity Present or Discovered   [] LDA Added if Not in Epic (Describe Wound)   [] New Altered Skin Integrity was Present on Admit and Documented in LDA   [] Wound Image Taken    Wound Care Consulted? No    Attending Nurse:  Evelyn LEHMAN     Second RN/Staff Member: Graciela LEHMAN

## 2023-10-24 NOTE — PLAN OF CARE
[facility] - Neurosurgery (Hospital)  Discharge Final Note    Primary Care Provider: Tuan Diaz MD    Expected Discharge Date: 10/24/2023    Patient discharged home.  The patient does not have any home needs.  Patient was traveling when she fell.  Patient to follow up with her home md's.    Final Discharge Note (most recent)       Final Note - 10/24/23 7398          Final Note    Assessment Type Final Discharge Note     Anticipated Discharge Disposition Home or Self Care        Post-Acute Status    Post-Acute Authorization Other     Other Status No Post-Acute Service Needs     Discharge Delays None known at this time                     Important Message from Medicare

## 2023-10-24 NOTE — PLAN OF CARE
Problem: Adult Inpatient Plan of Care  Goal: Plan of Care Review  Outcome: Ongoing, Progressing  Goal: Patient-Specific Goal (Individualized)  Outcome: Ongoing, Progressing  Goal: Absence of Hospital-Acquired Illness or Injury  Outcome: Ongoing, Progressing  Goal: Optimal Comfort and Wellbeing  Outcome: Ongoing, Progressing  Goal: Readiness for Transition of Care  Outcome: Ongoing, Progressing   Poc and meds reviewed with patient and son.Patient is AAOx4, ambulates to BR with assist.C/o of HA, tylenol 650 mg PO given X2 and was effective.Monitoring camera still in progress.bed is in low position, bed rails up x2, call light within reach and bed alarm on.

## 2023-10-24 NOTE — ED NOTES
The patient was incontinent of stool, linens changed and perineal care given. Patient repositioned for comfort, will continue to monitor.

## 2023-10-24 NOTE — PROVIDER PROGRESS NOTES - EMERGENCY DEPT.
Signout Note    I received signout from the previous provider.     Chief complaint:  Fall (Fall 30 mins PTA, [place]. +LOC. GCS 14. RUE weakness. L pupil dilated and sluggish.)      Pertinent history &exam:  Loly Pereira is a 73 y.o. who presented to emergency department with complaint of mechanical fall while intoxicated, with head injury, found to have subdural hematoma.  Signed out pending neurosurgery recommendations for final disposition    Vitals:    10/24/23 0250   BP:    Pulse: (!) 127   Resp:    Temp:        Imaging Studies:    X-Ray Cervical Spine 5 View With Flex And Ext   Final Result      No acute cervical fracture.         Electronically signed by: Jareth Salazar MD   Date:    10/24/2023   Time:    02:09      CT Head Without Contrast   Final Result      Stable subdural hemorrhage along the falx extending into the right tentorium.  No new hemorrhage.      Previously questioned hyperdensity about the lower parasagittal left frontal lobe is not visualized, likely artifact on prior exam.  There is, however, extension of the subdural hemorrhage along the falx down to the skull base in this region as well.      Redemonstration of soft tissue swelling involving the right supraorbital region, and soft tissues anterior to the mandible and maxilla.  Interval enlargement of small hematoma anterior to the mandible.      No acute displaced fracture of the facial bones.      Electronically signed by resident: Carson Reid   Date:    10/24/2023   Time:    01:17      Electronically signed by: Jareth Salazar MD   Date:    10/24/2023   Time:    03:01      CT Maxillofacial Without Contrast   Final Result      Stable subdural hemorrhage along the falx extending into the right tentorium.  No new hemorrhage.      Previously questioned hyperdensity about the lower parasagittal left frontal lobe is not visualized, likely artifact on prior exam.  There is, however, extension of the subdural hemorrhage  along the falx down to the skull base in this region as well.      Redemonstration of soft tissue swelling involving the right supraorbital region, and soft tissues anterior to the mandible and maxilla.  Interval enlargement of small hematoma anterior to the mandible.      No acute displaced fracture of the facial bones.      Electronically signed by resident: Carson Reid   Date:    10/24/2023   Time:    01:17      Electronically signed by: Jareth Salazar MD   Date:    10/24/2023   Time:    03:01      CTA STROKE MULTI-PHASE   Final Result   Abnormal      Thin subdural hemorrhage tracking along the falx extending to the right tentorium.      Hyperdensity about the lower parasagittal left frontal lobe in a region of streak artifact, likely artifactual however cannot exclude cortical contusions in the setting of recent trauma.  Attention on follow-up.      Right supraorbital scalp soft tissue swelling/contusion without displaced skull fracture.  Skin thickening and soft tissue swelling anterior to the mandible and maxilla.  Additionally, several foci of subcutaneous air in the subcutaneous tissues anterior to the maxilla.  Compatible with reported history of trauma.  No foreign body identified.      No evidence of acute large vessel occlusion or flow-limiting stenosis.      Additional findings as above.      This report was flagged in Epic as abnormal.      Findings were relayed by Carson Reid MD to Zoe SINGH via telephone on 10/23/2023 at 0819PM.      Electronically signed by resident: Carson Reid   Date:    10/23/2023   Time:    20:13      Electronically signed by: Mir Candelario MD   Date:    10/23/2023   Time:    21:30      CT Head Without Contrast    (Results Pending)       Medications Given:  Medications   glucose chewable tablet 16 g (has no administration in time range)   glucose chewable tablet 16 g (has no administration in time range)   glucose chewable tablet 24 g (has no administration in time  range)   glucagon (human recombinant) injection 1 mg (has no administration in time range)   HYDROcodone-acetaminophen 5-325 mg per tablet 1 tablet (has no administration in time range)   acetaminophen tablet 650 mg (has no administration in time range)   senna-docusate 8.6-50 mg per tablet 2 tablet (has no administration in time range)   dextrose 10% bolus 125 mL 125 mL (has no administration in time range)   dextrose 10% bolus 250 mL 250 mL (has no administration in time range)   iohexoL (OMNIPAQUE 350) injection 100 mL (100 mLs Intravenous Given 10/23/23 1938)   Tdap (BOOSTRIX) vaccine injection 0.5 mL (0.5 mLs Intramuscular Given 10/23/23 2147)   droPERidol injection 2.5 mg (2.5 mg Intravenous Given 10/24/23 0024)       Pending Items/ MDM:  73 y.o. female with above complaint.  I obtained a CT maxillofacial which does not demonstrate facial bone fracture.  There is a hematoma adjacent to the mandible, will provide supportive care.  Placed in observation to neurosurgery    Diagnostic Impression:    1. SDH (subdural hematoma)    2. Stroke    3. Fall, initial encounter    4. Alcoholic intoxication without complication    5. Blunt trauma of neck    6. Diarrhea, unspecified type         ED Disposition Condition    Observation Stable             Carol Soriano MD  Emergency Medicine

## 2023-10-24 NOTE — PLAN OF CARE
[facility] - Neurosurgery (Hospital)  Discharge Assessment    Primary Care Provider: Tuan Diaz MD     Discharge Assessment (most recent)       BRIEF DISCHARGE ASSESSMENT - 10/24/23 1601          Discharge Planning    Assessment Type Discharge Planning Brief Assessment     Resource/Environmental Concerns none     Equipment Currently Used at Home none     Current Living Arrangements home     Patient/Family Anticipates Transition to home     Patient/Family Anticipated Services at Transition none     DME Needed Upon Discharge  none     Discharge Plan A Home     Discharge Plan B Home

## 2023-10-24 NOTE — CONSULTS
[facility] - Emergency Dept  Vascular Neurology  Comprehensive Stroke Center  Consult Note    Inpatient consult to Vascular (Stroke) Neurology  Consult performed by: Brianda Coronado DNP  Consult ordered by: Guillaume Gonsalves MD      Assessment/Plan:     Patient is a 73 y.o. year old female with:    Right hand weakness  73 y.o. female with no significant medical history presented to the ED s/p mechanical fall. She initially had right arm weakness and left pupil changes but symptoms improved during EMS transport. CTA multiphase showed subdural hemorrhage, likely from trauma. On exam, patient with some confusion. Mild dysarthria possibly 2/2 facial trauma. 4/5 hand weakness may be 2/2 to fall as well.    At this time, suspect patient's symptoms are likely 2/2 to trauma.  Patient not a candidate for TNK due to SDH. No LVO or significant stenosis. Recommend MRI brain to definitively rule out stroke.          STROKE DOCUMENTATION     Acute Stroke Times   Last Known Normal Date: 10/23/23  Last Known Normal Time: 1840  Symptom Onset Date: 10/23/23  Symptom Onset Time: 1840  Stroke Team Called Date: 10/23/23  Stroke Team Called Time: 1910  Stroke Team Arrival Date: 10/23/23  Stroke Team Arrival Time: 1915  CT Interpretation Time: 1930  Thrombolytic Therapy Recommended: No  CTA Interpretation Time: 1932  Thrombectomy Recommended: No    NIH Scale:  Interval: baseline  1a. Level of Consciousness: 0-->Alert, keenly responsive  1b. LOC Questions: 1-->Answers one question correctly  1c. LOC Commands: 0-->Performs both tasks correctly  2. Best Gaze: 0-->Normal  3. Visual: 0-->No visual loss  4. Facial Palsy: 0-->Normal symmetrical movements  5a. Motor Arm, Left: 0-->No drift, limb holds 90 (or 45) degrees for full 10 secs  5b. Motor Arm, Right: 1-->Drift, limb holds 90 (or 45) degrees, but drifts down before full 10 secs, does not hit bed or other support  6a. Motor Leg, Left: 0-->No drift, leg holds 30 degree position for  full 5 secs  6b. Motor Leg, Right: 0-->No drift, leg holds 30 degree position for full 5 secs  7. Limb Ataxia: 0-->Absent  8. Sensory: 0-->Normal, no sensory loss  9. Best Language: 0-->No aphasia, normal  10. Dysarthria: 1-->Mild-to-moderate dysarthria, patient slurs at least some words and, at worst, can be understood with some difficulty  11. Extinction and Inattention (formerly Neglect): 0-->No abnormality  Total (NIH Stroke Scale): 3    Modified Polo Score: 1  [place] Coma Scale:14   ABCD2 Score:    [id number] Score:   HAS -BLED Score:   ICH Score:   Hunt & Carrera Classification:       Thrombolysis Candidate? No, CT findings (ICH, SAH, Large core infarct)     Delays to Thrombolysis?  Not Applicable    Interventional Revascularization Candidate?   Is the patient eligible for mechanical endovascular reperfusion (RYANNE)?  No; at this time symptoms not suggestive of large vessel occlusion    Delays to Thrombectomy? Not Applicable    Hemorrhagic change of an Ischemic Stroke: Does this patient have an ischemic stroke with hemorrhagic changes? No     Subjective:     History of Present Illness:  73 y.o. female with no significant medical history presented to the ED s/p mechanical fall. Per EMS, the patient is visiting a friend from [place] and had two margaritas this evening. She tripped and fell, hit her head and loss consciousness for about 25-30 minutes. Patient was LKN 30 minutes prior to arriving at the ED (around 1840). She became confused post fall with a right-sided weakness. Per EMS, her left pupil was pinpoint and fixed when they arrived on scene but became sluggish and reactive during transport. BS 89. On arrival to [facility] [place], the patient is awake and alert, follows commands. A&OX2 (disoriented to time), PERRL, slight right arm drift, slight dysarthria. NIHSS 3. CTA multiphase concerning for blood products.     At this time, symptoms seem more to be 2/2 trauma from the fall. Will obtain MRI to definitively r/o  stroke.             No past medical history on file.  No past surgical history on file.       Review of patient's allergies indicates:  No Known Allergies    Medications: I have reviewed the current medication administration record.    (Not in a hospital admission)      Review of Systems   HENT:  Positive for nosebleeds.         Bright red blood noticed to right head and face from trauma.    Respiratory:  Negative for shortness of breath.    Neurological:  Negative for speech difficulty, weakness, numbness and headaches.   Psychiatric/Behavioral:  Negative for agitation.      Objective:     Vital Signs (Most Recent):  Temp: 98.3 °F (36.8 °C) (10/23/23 1906)  Pulse: 80 (10/23/23 1906)  Resp: (!) 24 (10/23/23 1906)  BP: (!) 155/66 (10/23/23 1906)  SpO2: 99 % (10/23/23 1906)    Vital Signs Range (Last 24H):  Temp:  [98.3 °F (36.8 °C)]   Pulse:  [80]   Resp:  [24]   BP: (155)/(66)   SpO2:  [99 %]        Physical Exam  Constitutional:       General: She is not in acute distress.  HENT:      Head:      Comments: Bright red blood noticed to right head and face from trauma.      Nose:      Comments: Right nare with dried blood  Eyes:      Pupils: Pupils are equal, round, and reactive to light.   Pulmonary:      Effort: No respiratory distress.   Neurological:      Mental Status: She is alert. She is disoriented.      Cranial Nerves: No cranial nerve deficit.      Sensory: No sensory deficit.      Motor: No weakness.              Neurological Exam:   LOC: alert  Attention Span: periods of confusion  Language: No aphasia  Orientation: Person, Place, Not oriented to time  Visual Fields: Full  EOM (CN III, IV, VI): Full/intact  Pupils (CN II, III): PERRL  Facial Sensation (CN V): Normal  Facial Movement (CN VII): Symmetric facial expression    Motor: Arm left  Normal 5/5  Leg left  Normal 5/5  Arm right  Normal 5/5, R hand/wrist 4/5  Leg right Normal 5/5  Sensation: Intact to light touch, temperature and  "vibration      Laboratory:  CMP:   Recent Labs   Lab 10/23/23  1953   CALCIUM 8.8   ALBUMIN 3.8   PROT 6.9      K 3.4*   CO2 17*      BUN 8   CREATININE 0.7   ALKPHOS 100   ALT 28   AST 34   BILITOT 0.3     CBC:   Recent Labs   Lab 10/23/23  1953   WBC 8.71   RBC 4.89   HGB 14.6   HCT 45.2      MCV 92   MCH 29.9   MCHC 32.3     Lipid Panel:   Recent Labs   Lab 10/23/23  1953   CHOL 177   LDLCALC 84.8   HDL 69   TRIG 116     Coagulation:   Recent Labs   Lab 10/23/23  2037   INR 1.0     Hgb A1C: No results for input(s): "HGBA1C" in the last 168 hours.  TSH:   Recent Labs   Lab 10/23/23  1953   TSH 1.619       Diagnostic Results:      Brain imaging/Vessel Imaging:    CTA multiphase 10/23/23  Impression:     Thin subdural hemorrhage tracking along the falx extending to the right tentorium.     Hyperdensity about the lower parasagittal left frontal lobe in a region of streak artifact, likely artifactual however cannot exclude cortical contusions in the setting of recent trauma.  Attention on follow-up.     Right supraorbital scalp soft tissue swelling/contusion without displaced skull fracture.  Skin thickening and soft tissue swelling anterior to the mandible and maxilla.  Additionally, several foci of subcutaneous air in the subcutaneous tissues anterior to the maxilla.  Compatible with reported history of trauma.  No foreign body identified.     No evidence of acute large vessel occlusion or flow-limiting stenosis.        Brianda Coronado DNP  [facility]  Department of Vascular Neurology   [facility]   "

## 2023-10-24 NOTE — ASSESSMENT & PLAN NOTE
73 y.o. female with no significant medical history presented to the ED s/p mechanical fall. She initially had right arm weakness and left pupil changes but symptoms improved during EMS transport. CTA multiphase showed subdural hemorrhage, likely from trauma. On exam, patient with some confusion. Mild dysarthria possibly 2/2 facial trauma. 4/5 hand weakness may be 2/2 to fall as well.    At this time, suspect patient's symptoms are likely 2/2 to trauma.  Patient not a candidate for TNK due to SDH. No LVO or significant stenosis. Recommend MRI brain to definitively rule out stroke.

## 2023-10-24 NOTE — SUBJECTIVE & OBJECTIVE
(Not in a hospital admission)      Review of patient's allergies indicates:  No Known Allergies    History reviewed. No pertinent past medical history.  No past surgical history on file.  Family History    None       Tobacco Use    Smoking status: Not on file    Smokeless tobacco: Not on file   Substance and Sexual Activity    Alcohol use: Not on file    Drug use: Not on file    Sexual activity: Not on file     Review of Systems   Constitutional:  Negative for activity change.   HENT:  Negative for congestion.    Respiratory:  Negative for chest tightness.    Cardiovascular:  Negative for chest pain.   Gastrointestinal:  Negative for abdominal distention.   Genitourinary:  Negative for difficulty urinating.   Musculoskeletal:  Negative for arthralgias and back pain.   Neurological:  Positive for headaches. Negative for dizziness.   Psychiatric/Behavioral:  Negative for agitation.      Objective:        There is no height or weight on file to calculate BMI.  Vital Signs (Most Recent):  Temp: 98.3 °F (36.8 °C) (10/23/23 1906)  Pulse: 95 (10/23/23 2130)  Resp: 20 (10/23/23 2130)  BP: (!) 151/66 (10/23/23 2130)  SpO2: 98 % (10/23/23 2130) Vital Signs (24h Range):  Temp:  [98.3 °F (36.8 °C)] 98.3 °F (36.8 °C)  Pulse:  [80-95] 95  Resp:  [20-24] 20  SpO2:  [97 %-99 %] 98 %  BP: (148-155)/(66-94) 151/66                                 Physical Exam         Neurosurgery Physical Exam  General: AOx3, GCS E4V5M6  CNII-XII: Intact on detailed exam, PERRL, visual fields grossly intact, EOMi, facial sensation preserved, no facial assymetry, tongue/uvula/palate midline, shoulder shrug equal, no pronator drift  Extremities: 5/5 motor throughout, sensorium intact throughout, coordination intact throughout, DTRs 2+, no pathological reflexes, no sensory level present    General: Awake, Alert, Oriented  Head: Non-traumatic, normocephalic  Eyes: Pupils equal, EOMi  Neck: Supple, normal ROM, no tenderness to palpation  CVS: Normal rate  and rhythm, distal pulses present  Pulm: Symmetric expansion, no respiratory distress  GI: Abdomen nondistended, nontender  MSK: Moves all extremities without restriction, atraumatic  Skin: Dry, intact  Psych: Normal thought content and cognition    Significant Labs:  Recent Labs   Lab 10/23/23  1953   GLU 88      K 3.4*      CO2 17*   BUN 8   CREATININE 0.7   CALCIUM 8.8     Recent Labs   Lab 10/23/23  1953   WBC 8.71   HGB 14.6   HCT 45.2        Recent Labs   Lab 10/23/23  1953 10/23/23  2037   INR 1.0 1.0     Microbiology Results (last 7 days)       ** No results found for the last 168 hours. **          All pertinent labs from the last 24 hours have been reviewed.    Significant Diagnostics:  I have reviewed all pertinent imaging results/findings within the past 24 hours.

## 2023-10-24 NOTE — ED PROVIDER NOTES
Encounter Date: 10/23/2023       History     Chief Complaint   Patient presents with    Fall     Fall 30 mins PTA[place] +LOC. GCS 14. RUE weakness. L pupil dilated and sluggish.     HPI    72 y/o F no reported PMH who presents to the ED via EMS for evaluation of a fall.  [place], sustaining a trip and fall onto her face. She had +LOC and was reportedly unresponsive for a period of time, only awakening en route via EMS.  In the ED she c/o achy frontal headache.  Code stroke is called because of concern for bleed.      Review of patient's allergies indicates:  No Known Allergies  History reviewed. No pertinent past medical history.  No past surgical history on file.  No family history on file.     Review of Systems   Unable to perform ROS: Acuity of condition       Physical Exam     Initial Vitals [10/23/23 1906]   BP Pulse Resp Temp SpO2   (!) 155/66 80 (!) 24 98.3 °F (36.8 °C) 99 %      MAP       --           Physical Exam    Nursing note and vitals reviewed.  Constitutional: She appears well-developed and well-nourished.   HENT:   Bruising of the right jaw and dried blood in the nares, TTP of right jaw   Eyes: Conjunctivae and EOM are normal.   Neck: Neck supple.   C-collar in place   Cardiovascular:  Normal rate and regular rhythm.           Pulmonary/Chest: Breath sounds normal. No respiratory distress.   Abdominal: Abdomen is soft. There is no abdominal tenderness.   Musculoskeletal:         General: No edema. Normal range of motion.      Cervical back: Neck supple.     Neurological: She is alert and oriented to person, place, and time.   Clinically some slurred speech, moves all extremities, no aphasia, answers questions, follows commands   Skin: Skin is warm and dry.   Psychiatric: She has a normal mood and affect. Thought content normal.         ED Course   Procedures  Labs Reviewed   CBC W/ AUTO DIFFERENTIAL - Abnormal; Notable for the  following components:       Result Value    Lymph % 16.2 (*)     All other components within normal limits    Narrative:     Release to patient->Immediate   COMPREHENSIVE METABOLIC PANEL - Abnormal; Notable for the following components:    Potassium 3.4 (*)     CO2 17 (*)     All other components within normal limits    Narrative:     Release to patient->Immediate   ALCOHOL,MEDICAL (ETHANOL) - Abnormal; Notable for the following components:    Alcohol, Serum 124 (*)     All other components within normal limits    Narrative:     Release to patient->Immediate   POCT GLUCOSE - Abnormal; Notable for the following components:    POCT Glucose 139 (*)     All other components within normal limits   HIV 1 / 2 ANTIBODY    Narrative:     Release to patient->Immediate   HEPATITIS C ANTIBODY    Narrative:     Release to patient->Immediate   PROTIME-INR    Narrative:     Release to patient->Immediate   TSH    Narrative:     Release to patient->Immediate   LIPID PANEL    Narrative:     Release to patient->Immediate   APTT   POCT GLUCOSE, HAND-HELD DEVICE   TYPE & SCREEN   ISTAT PROCEDURE          Imaging Results              CT Maxillofacial Without Contrast (In process)                      CT Head Without Contrast (In process)                       CTA STROKE MULTI-PHASE (Final result)  Result time 10/23/23 21:30:08      Final result by Mir Candelario MD (10/23/23 21:30:08)                   Impression:      Thin subdural hemorrhage tracking along the falx extending to the right tentorium.    Hyperdensity about the lower parasagittal left frontal lobe in a region of streak artifact, likely artifactual however cannot exclude cortical contusions in the setting of recent trauma.  Attention on follow-up.    Right supraorbital scalp soft tissue swelling/contusion without displaced skull fracture.  Skin thickening and soft tissue swelling anterior to the mandible and maxilla.  Additionally, several foci of subcutaneous air in the  subcutaneous tissues anterior to the maxilla.  Compatible with reported history of trauma.  No foreign body identified.    No evidence of acute large vessel occlusion or flow-limiting stenosis.    Additional findings as above.    This report was flagged in Epic as abnormal.    Findings were relayed by Carson Reid MD to Zoe SINGH via telephone on 10/23/2023 at 0819PM.    Electronically signed by resident: Carson Reid  Date:    10/23/2023  Time:    20:13    Electronically signed by: Mir Candelario MD  Date:    10/23/2023  Time:    21:30               Narrative:    EXAMINATION:  CTA STROKE MULTI-PHASE    CLINICAL HISTORY:  Neuro deficit, acute, stroke suspected;    TECHNIQUE:  Axial CT images obtained throughout the region of the head before and after the administration of intravenous contrast.  CT angiogram was performed from through the cervical and intracranial vasculature during the IV bolus administration of 100mL of Omnipaque 350.  Multiplanar MPR and MIP reformats were performed.    The examination was performed using a multiphase protocol with 2 additional delayed images through the brain.    CT source data was analyzed using artificial intelligence software for detection of a large vessel occlusions (LVO) in order to enable computer assisted triage notification and aid clinical stroke decision making.    COMPARISON:  None.    FINDINGS:  CT head & neck:    Hyperdense material tracking along the falx extending to the right tentorium compatible with hemorrhage.  There is hyperdensity about the lower parasagittal left frontal lobe in a region of streak artifact.    Diffuse supratentorial white matter hypoattenuation, nonspecific, likely represents chronic small vessel ischemic change.  Focus of hypoattenuation in the white matter adjacent to the right insular cortex, likely remote lacunar-type infarct.  No evidence of acute infarction.  Scattered atherosclerotic vascular calcifications noted.    Age-related  generalized cerebral volume loss.  Ventricles are midline without distortion by mass effect or acute hydrocephalus.  Basal cisterns are patent.    Minimal mucosal thickening within the left maxillary sinus.  Mastoid air cells and remaining imaged paranasal sinuses are well aerated.  Hyperostosis frontalis interna.  No displaced skull fracture.    There is an area of soft tissue swelling about the right supraorbital region, compatible with contusion.  Additionally, there is skin thickening and soft tissue swelling adjacent to the anterior mandible with a hyperdense focus measuring 1.5 cm compatible with small hematoma and surrounding soft tissue swelling/contusion.  There is involvement anterior to the maxilla, with several locules of subcutaneous air in this region.  No underlying fracture.  No foreign body.    Salivary glands are without significant abnormality. Thyroid is without significant abnormality.    No cervical lymphadenopathy.    Prevertebral soft tissues and pharynx are unremarkable.    Trachea is patent.  Visualized lungs are without significant abnormality.  Biapical pleuroparenchymal scarring.  No apical pneumothorax.  Postsurgical change about the left upper lung, presumably from prior lobectomy.    Degenerative changes throughout the cervical spine.  Generalized osteopenia.  Chronic appearing minimal to anterior wedge with superimposed mild compression deformity of the superior endplate of C7 vertebral body.  Suspected small intra osseous hemangioma at C7 vertebral body.  Prominent sclerotic focus within T2 vertebral body suggestive of a large bone island.  No acute displaced fracture-dislocation..    CT angiogram:    Left-sided aortic arch with 3 branch vessels. Mild calcifications of the aortic arch.  Brachiocephalic and proximal subclavian arteries are patent.    Right common carotid artery is patent without significant stenosis. Right carotid bifurcation contains minimal atherosclerosis  representing less than 50% stenosis per NASCET criteria. Right internal carotid artery is patent without significant stenosis.    Left common carotid artery is patent without significant stenosis. Left carotid bifurcation contains minimal atherosclerosis representing less than 50% stenosis per NASCET criteria. Left internal carotid artery is patent without significant stenosis.    Anterior cerebral arteries are patent without significant stenosis or aneurysm.  Middle cerebral arteries are patent without significant stenosis or aneurysm.    Right vertebral artery origin is patent. Right vertebral artery is patent without significant stenosis.    Left vertebral artery origin is patent. Left vertebral artery is patent without significant stenosis.  Left vertebral artery is slightly dominant.    Basilar artery is patent without significant stenosis.    Posterior cerebral arteries are patent without significant stenosis or aneurysm.    Dural venous sinuses and veins are patent.                                       Medications   iohexoL (OMNIPAQUE 350) injection 100 mL (100 mLs Intravenous Given 10/23/23 1938)   Tdap (BOOSTRIX) vaccine injection 0.5 mL (0.5 mLs Intramuscular Given 10/23/23 2147)     Medical Decision Making  74 y/o F here after a fall.  Suspect mostly trauma causing LOC, but given long down time with reported unequal pupils we activated for stroke code.  Normal WBC, normal lytes. EKG no STEMI. +ETOH on labs.   CTA showing no LVO. Does show +SDH. NSGY was consulted.  Tdap was updated. Face was cleaned, small lac on R eyelid, steri strip was placed.  Pending NSGY recs at shift change, oncoming team to follow.     Amount and/or Complexity of Data Reviewed  Labs: ordered.  Radiology: ordered. Decision-making details documented in ED Course.    Risk  Prescription drug management.               ED Course as of 10/23/23 2352   Mon Oct 23, 2023   3670 MRI Lumbar Spine Without Contrast []      ED Course User  Index  [DR] Guillaume Gonsalves MD                  EKG:  Rate 97  NSR  1st degree AV block  No STEMI    Clinical Impression:   Final diagnoses:  [I63.9] Stroke  [S06.5XAA] SDH (subdural hematoma) (Primary)  [W19.XXXA] Fall, initial encounter  [F10.920] Alcoholic intoxication without complication  [S19.80XA] Blunt trauma of neck        ED Disposition Condition    Observation Stable                Guillaume Gonsalves MD  10/24/23 0001      ADDENDUM: Critical care time of 31 minutes.           Guillaume Gonsalves MD  11/28/23 1119

## 2023-10-24 NOTE — PLAN OF CARE
Problem: Adult Inpatient Plan of Care  Goal: Plan of Care Review  Outcome: Ongoing, Progressing  Goal: Patient-Specific Goal (Individualized)  Outcome: Ongoing, Progressing  Goal: Absence of Hospital-Acquired Illness or Injury  Outcome: Ongoing, Progressing  Intervention: Identify and Manage Fall Risk  Flowsheets (Taken 10/24/2023 0408)  Safety Promotion/Fall Prevention:   assistive device/personal item within reach   bed alarm set   /camera at bedside   side rails raised x 2   family to remain at bedside   Fall Risk signage in place   nonskid shoes/socks when out of bed   muscle strengthening facilitated   medications reviewed  Goal: Optimal Comfort and Wellbeing  Outcome: Ongoing, Progressing  Intervention: Monitor Pain and Promote Comfort  Flowsheets (Taken 10/24/2023 0408)  Pain Management Interventions:   pain management plan reviewed with patient/caregiver   quiet environment facilitated   relaxation techniques promoted

## 2023-10-24 NOTE — ED TRIAGE NOTES
Patient is a 73-year-old female presents to the ED via EMS with c/o a fall. Patient was out having a few drinks with friends when she fell forward and hit her head. Patient was unconscious for 30 minutes per EMS. GCS 14 and C-collar in placed upon arrival.

## 2023-10-24 NOTE — CARE UPDATE
Patient's chart was reviewed by a stroke team provider and discussed with staff.    Briefly, Loly Pereira is a 73 y.o. female with no significant medical history presented to the ED s/p mechanical fall. She initially had right arm weakness and left pupil changes but symptoms improved during EMS transport. CTA multiphase showed subdural hemorrhage, likely from trauma. On exam, patient with some confusion. Mild dysarthria possibly 2/2 facial trauma. 4/5 hand weakness may be 2/2 to fall as well.     At this time, suspect patient's symptoms are likely 2/2 to trauma.  Patient not a candidate for TNK due to SDH. No LVO or significant stenosis. No further stroke workup recommended, VN will sign off. Please contact stroke team with any questions/concerns.        Elle Booker PA-C  Department of Vascular Neurology  [facility]  [facility]  [phone]

## 2023-10-25 NOTE — CONSULTS
[facility] ([facility])  Orthopedics  Consult Note    Patient Name: Loly Pereira  MRN: [id number]  Admission Date: 10/23/2023  Hospital Length of Stay: 0 days  Attending Provider: No att. providers found  Primary Care Provider: Tuan Diaz MD    Patient information was obtained from patient, relative(s) and ER records.     Consults  Subjective:     Principal Problem:Subdural hematoma    Chief Complaint:   Chief Complaint   Patient presents with    Fall     Fall 30 mins PTA, tripped and fell at [facility]. +LOC. GCS 14. RUE weakness. L pupil dilated and sluggish.        HPI: Pt is a 73 F with pmh of lung cancer s/p resection in 2015 presents with ground level fall 1 day ago. She reported hitting her head and LOC.  She reported recent history of multiple falls. She is ambulatory without a walker at baseline. She sustained a SDH that is being followed by neurosurgery. Orthopedic was consulted d for evaluation of right hand pain. She is unsure if she hit the hand yesterday. She denied numbness, tingling or swelling to the hand. She is RHD. She denied any other MSK injuries. She lives in [place] and plans to go back home tomorrow. She wants her f/u to be in [place].    Reported social alcohol use  Denied tobacco or recreational drug use      Past Medical History:   Diagnosis Date    Cancer        History reviewed. No pertinent surgical history.    Review of patient's allergies indicates:   Allergen Reactions    Codeine        No current facility-administered medications for this encounter.     Current Outpatient Medications   Medication Sig    FLUoxetine 20 MG capsule Take 20 mg by mouth once daily.    levothyroxine (SYNTHROID) 50 MCG tablet Take 50 mcg by mouth before breakfast.    methotrexate 2.5 MG Tab Take 2.5 mg by mouth every 7 days.     Family History    None       Tobacco Use    Smoking status: Never    Smokeless tobacco: Never   Substance and Sexual Activity    Alcohol use: Yes      Alcohol/week: 1.0 standard drink of alcohol     Types: 1 Drinks containing 0.5 oz of alcohol per week    Drug use: Not on file    Sexual activity: Not on file     ROS  Constitutional: negative for fevers  Eyes: negative visual changes  ENT: negative for hearing loss  Respiratory: negative for dyspnea  Cardiovascular: negative for chest pain  Gastrointestinal: negative for abdominal pain  Genitourinary: negative for dysuria  Neurological: negative for headaches  Behavioral/Psych: negative for hallucinations  Endocrine: negative for temperature intolerance    Objective:     Vital Signs (Most Recent):  Temp: 97.8 °F (36.6 °C) (10/24/23 1110)  Pulse: 86 (10/24/23 1118)  Resp: 16 (10/24/23 1110)  BP: 138/61 (10/24/23 1110)  SpO2: (!) 93 % (10/24/23 1110) Vital Signs (24h Range):  Temp:  [96.5 °F (35.8 °C)-98.6 °F (37 °C)] 97.8 °F (36.6 °C)  Pulse:  [] 86  Resp:  [16-24] 16  SpO2:  [93 %-98 %] 93 %  BP: (138-166)/(61-79) 138/61     Weight: 60.7 kg (133 lb 13.1 oz)  Height: 5' (152.4 cm)  Body mass index is 26.13 kg/m².    No intake or output data in the 24 hours ending 10/24/23 2024     Ortho/SPM Exam   Gen:  No acute distress, well-developed, well nourished.  CV:  Peripherally well-perfused. 2+ radial pulses, symmetric. AO x 3  Respiratory:  Normal respiratory effort. No accessory muscle use.   Head/Neck:  Normocephalic.  Atraumatic. Sclera anicteric. TM. Neck supple.  Neuro: No FND. Awake. Alert. Oriented to person, place, time, and situation.        MSK:  Right Upper Extremity  Inspection  - Skin intact throughout, no open wounds  - No swelling  - No ecchymosis, erythema, or signs of cellulitis  Palpation  - mild TTP over the 4th metacarpal   Range of motion  - AROM and PROM of the shoulder, elbow, wrist, and hand intact  Stability  - No evidence of joint dislocation or abnormal laxity   Neurovascular  - AIN/PIN/Radial/Median/Ulnar Nerves assessed in isolation without deficit  - Able to give thumbs up, make  ""OK" sign, cross IF/LF, abduct/adduct fingers, make fist  - SILT throughout  - Compartments soft  - Radial artery palpated  - Capillary Refill <3s  - Muscle tone normal    Left Upper Extremity  Inspection  - Skin intact throughout, no open wounds  - No swelling  - No ecchymosis, erythema, or signs of cellulitis  Palpation  - NonTTP throughout, no palpable abnormality   Range of motion  - AROM and PROM of the shoulder, elbow, wrist, and hand intact  Stability  - No evidence of joint dislocation or abnormal laxity  Neurovascular  - AIN/PIN/Radial/Median/Ulnar Nerves assessed in isolation without deficit  - Able to give thumbs up, make "OK" sign, cross IF/LF, abduct/adduct fingers, make fist  - SILT throughout  - Compartments soft  - Radial artery palpated  - Capillary Refill <3s  - Muscle tone normal      Right Lower Extremity  Inspection  - Skin intact throughout, no open wounds  - No swelling  - No ecchymosis, erythema, or signs of cellulitis  Palpation  - NonTTP throughout, no palpable abnormality   Range of motion  - AROM and PROM of the hip, knee, ankle, and foot intact  Stability  - No evidence of joint dislocation or abnormal laxity,   Neurovascular  - TA/EHL/Gastroc/FHL assessed in isolation without deficit  - SILT throughout  - Compartments soft  - DP palpated   - Capillary Refill <3s  - Negative Log roll  - Negative Stinchfield  - Muscle tone normal    Left Lower Extremity  Inspection  - Skin intact throughout, no open wounds  - No swelling  - No ecchymosis, erythema, or signs of cellulitis  Palpation  - NonTTP throughout, no palpable abnormality   Range of motion  - AROM and PROM of the hip, knee, ankle, and foot intact  Stability  - No evidence of joint dislocation or abnormal laxity,   Neurovascular  - TA/EHL/Gastroc/FHL assessed in isolation without deficit  - SILT throughout  - Compartments soft  - DP palpated   - Capillary Refill <3s  - Negative Log roll  - Negative Stinchfield  - Muscle tone " "normal    Spine/pelvis/axial body:  No tenderness to palpation of cervical, thoracic, or lumbar spine  No pain with compression of pelvis  No chest wall or abdominal tenderness  No decubitus ulcers  Muscle tone normal    Significant Labs: A1C: No results for input(s): "HGBA1C" in the last 4320 hours.  CBC:   Recent Labs   Lab 10/23/23  1953 10/24/23  0325   WBC 8.71 11.65   HGB 14.6 14.6   HCT 45.2 44.1    240     CMP:   Recent Labs   Lab 10/23/23  1953 10/24/23  0845    139   K 3.4* 4.0    106   CO2 17* 22*   GLU 88 125*   BUN 8 9   CREATININE 0.7 0.7   CALCIUM 8.8 9.0   PROT 6.9  --    ALBUMIN 3.8  --    BILITOT 0.3  --    ALKPHOS 100  --    AST 34  --    ALT 28  --    ANIONGAP 14 11     All pertinent labs within the past 24 hours have been reviewed.    Significant Imaging: I have reviewed all pertinent imaging results/findings.  X-ray of the right hand revealed minimally displaced spiral fracture of the 4th metatarsal shaft.    Assessment/Plan:     Closed fracture of shaft of metatarsal bone of right foot  Loly Pereira is a 73 y.o. female with closed, minimally displaced 4th metatarsal shaft fracture. She is NVI. Based on imaging and examination, this fracture is likely subacute. There is absence of swelling or ecchymosis over the dorsal hand. No finger angulation or malrotation was appreciated. She has full range of motion with mild discomfort. She was placed in a TKO brace for comfort. Pt will f/u with her doctor at home in [place].    - WB: WBAT RUE  - Pain control: Multimodal pain regimen  - No orthopedic intervention anticipated.        Thank you for your consult.     Arron Mazariegos MD  Orthopedics  [facility] ([facility])      "

## 2023-10-25 NOTE — SUBJECTIVE & OBJECTIVE
Past Medical History:   Diagnosis Date    Cancer        History reviewed. No pertinent surgical history.    Review of patient's allergies indicates:   Allergen Reactions    Codeine        No current facility-administered medications for this encounter.     Current Outpatient Medications   Medication Sig    FLUoxetine 20 MG capsule Take 20 mg by mouth once daily.    levothyroxine (SYNTHROID) 50 MCG tablet Take 50 mcg by mouth before breakfast.    methotrexate 2.5 MG Tab Take 2.5 mg by mouth every 7 days.     Family History    None       Tobacco Use    Smoking status: Never    Smokeless tobacco: Never   Substance and Sexual Activity    Alcohol use: Yes     Alcohol/week: 1.0 standard drink of alcohol     Types: 1 Drinks containing 0.5 oz of alcohol per week    Drug use: Not on file    Sexual activity: Not on file     ROS  Constitutional: negative for fevers  Eyes: negative visual changes  ENT: negative for hearing loss  Respiratory: negative for dyspnea  Cardiovascular: negative for chest pain  Gastrointestinal: negative for abdominal pain  Genitourinary: negative for dysuria  Neurological: negative for headaches  Behavioral/Psych: negative for hallucinations  Endocrine: negative for temperature intolerance    Objective:     Vital Signs (Most Recent):  Temp: 97.8 °F (36.6 °C) (10/24/23 1110)  Pulse: 86 (10/24/23 1118)  Resp: 16 (10/24/23 1110)  BP: 138/61 (10/24/23 1110)  SpO2: (!) 93 % (10/24/23 1110) Vital Signs (24h Range):  Temp:  [96.5 °F (35.8 °C)-98.6 °F (37 °C)] 97.8 °F (36.6 °C)  Pulse:  [] 86  Resp:  [16-24] 16  SpO2:  [93 %-98 %] 93 %  BP: (138-166)/(61-79) 138/61     Weight: 60.7 kg (133 lb 13.1 oz)  Height: 5' (152.4 cm)  Body mass index is 26.13 kg/m².    No intake or output data in the 24 hours ending 10/24/23 2024     Ortho/SPM Exam   Gen:  No acute distress, well-developed, well nourished.  CV:  Peripherally well-perfused. 2+ radial pulses, symmetric. AO x 3  Respiratory:  Normal respiratory  "effort. No accessory muscle use.   Head/Neck:  Normocephalic.  Atraumatic. Sclera anicteric. TM. Neck supple.  Neuro: No FND. Awake. Alert. Oriented to person, place, time, and situation.        MSK:  Right Upper Extremity  Inspection  - Skin intact throughout, no open wounds  - No swelling  - No ecchymosis, erythema, or signs of cellulitis  Palpation  - mild TTP over the 4th metacarpal   Range of motion  - AROM and PROM of the shoulder, elbow, wrist, and hand intact  Stability  - No evidence of joint dislocation or abnormal laxity   Neurovascular  - AIN/PIN/Radial/Median/Ulnar Nerves assessed in isolation without deficit  - Able to give thumbs up, make "OK" sign, cross IF/LF, abduct/adduct fingers, make fist  - SILT throughout  - Compartments soft  - Radial artery palpated  - Capillary Refill <3s  - Muscle tone normal    Left Upper Extremity  Inspection  - Skin intact throughout, no open wounds  - No swelling  - No ecchymosis, erythema, or signs of cellulitis  Palpation  - NonTTP throughout, no palpable abnormality   Range of motion  - AROM and PROM of the shoulder, elbow, wrist, and hand intact  Stability  - No evidence of joint dislocation or abnormal laxity  Neurovascular  - AIN/PIN/Radial/Median/Ulnar Nerves assessed in isolation without deficit  - Able to give thumbs up, make "OK" sign, cross IF/LF, abduct/adduct fingers, make fist  - SILT throughout  - Compartments soft  - Radial artery palpated  - Capillary Refill <3s  - Muscle tone normal      Right Lower Extremity  Inspection  - Skin intact throughout, no open wounds  - No swelling  - No ecchymosis, erythema, or signs of cellulitis  Palpation  - NonTTP throughout, no palpable abnormality   Range of motion  - AROM and PROM of the hip, knee, ankle, and foot intact  Stability  - No evidence of joint dislocation or abnormal laxity,   Neurovascular  - TA/EHL/Gastroc/FHL assessed in isolation without deficit  - SILT throughout  - Compartments soft  - DP palpated " "  - Capillary Refill <3s  - Negative Log roll  - Negative Stinchfield  - Muscle tone normal    Left Lower Extremity  Inspection  - Skin intact throughout, no open wounds  - No swelling  - No ecchymosis, erythema, or signs of cellulitis  Palpation  - NonTTP throughout, no palpable abnormality   Range of motion  - AROM and PROM of the hip, knee, ankle, and foot intact  Stability  - No evidence of joint dislocation or abnormal laxity,   Neurovascular  - TA/EHL/Gastroc/FHL assessed in isolation without deficit  - SILT throughout  - Compartments soft  - DP palpated   - Capillary Refill <3s  - Negative Log roll  - Negative Stinchfield  - Muscle tone normal    Spine/pelvis/axial body:  No tenderness to palpation of cervical, thoracic, or lumbar spine  No pain with compression of pelvis  No chest wall or abdominal tenderness  No decubitus ulcers  Muscle tone normal    Significant Labs: A1C: No results for input(s): "HGBA1C" in the last 4320 hours.  CBC:   Recent Labs   Lab 10/23/23  1953 10/24/23  0325   WBC 8.71 11.65   HGB 14.6 14.6   HCT 45.2 44.1    240     CMP:   Recent Labs   Lab 10/23/23  1953 10/24/23  0845    139   K 3.4* 4.0    106   CO2 17* 22*   GLU 88 125*   BUN 8 9   CREATININE 0.7 0.7   CALCIUM 8.8 9.0   PROT 6.9  --    ALBUMIN 3.8  --    BILITOT 0.3  --    ALKPHOS 100  --    AST 34  --    ALT 28  --    ANIONGAP 14 11     All pertinent labs within the past 24 hours have been reviewed.    Significant Imaging: I have reviewed all pertinent imaging results/findings.  X-ray of the right hand revealed minimally displaced spiral fracture of the 4th metatarsal shaft.  "

## 2023-10-25 NOTE — ASSESSMENT & PLAN NOTE
Loly Pereira is a 73 y.o. female with closed, minimally displaced 4th metatarsal shaft fracture. She is NVI. Based on imaging and examination, this fracture is likely subacute. There is absence of swelling or ecchymosis over the dorsal hand. No finger angulation or malrotation was appreciated. She has full range of motion with mild discomfort. She was placed in a TKO brace for comfort. Pt will f/u with her doctor at home in [place].    - WB: WBAT RUE  - Pain control: Multimodal pain regimen  - No orthopedic intervention anticipated.

## 2023-10-25 NOTE — HPI
Pt is a 73 F with pmh of lung cancer s/p resection in 2015 presents with ground level fall 1 day ago. She reported hitting her head and LOC.  She reported recent history of multiple falls. She is ambulatory without a walker at baseline. She sustained a SDH that is being followed by neurosurgery. Orthopedic was consulted d for evaluation of right hand pain. She is unsure if she hit the hand yesterday. She denied numbness, tingling or swelling to the hand. She is RHD. She denied any other MSK injuries. She lives in [place] and plans to go back home tomorrow. She wants her f/u to be in [place].    Reported social alcohol use  Denied tobacco or recreational drug use